# Patient Record
Sex: FEMALE | Race: BLACK OR AFRICAN AMERICAN | Employment: OTHER | URBAN - METROPOLITAN AREA
[De-identification: names, ages, dates, MRNs, and addresses within clinical notes are randomized per-mention and may not be internally consistent; named-entity substitution may affect disease eponyms.]

---

## 2020-05-21 ENCOUNTER — HOSPITAL ENCOUNTER (EMERGENCY)
Facility: HOSPITAL | Age: 85
Discharge: HOME | End: 2020-05-21
Attending: EMERGENCY MEDICINE
Payer: MEDICARE

## 2020-05-21 ENCOUNTER — APPOINTMENT (OUTPATIENT)
Dept: GENERAL RADIOLOGY | Facility: HOSPITAL | Age: 85
End: 2020-05-21
Attending: EMERGENCY MEDICINE
Payer: MEDICARE

## 2020-05-21 ENCOUNTER — APPOINTMENT (OUTPATIENT)
Dept: CT IMAGING | Facility: HOSPITAL | Age: 85
End: 2020-05-21
Attending: EMERGENCY MEDICINE
Payer: MEDICARE

## 2020-05-21 VITALS
OXYGEN SATURATION: 95 % | TEMPERATURE: 99 F | HEART RATE: 62 BPM | DIASTOLIC BLOOD PRESSURE: 68 MMHG | RESPIRATION RATE: 20 BRPM | SYSTOLIC BLOOD PRESSURE: 145 MMHG
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
## 2020-05-21 PROCEDURE — 70450 CT HEAD/BRAIN W/O DYE: CPT | Performed by: EMERGENCY MEDICINE

## 2020-05-21 PROCEDURE — 99284 EMERGENCY DEPT VISIT MOD MDM: CPT

## 2020-05-21 PROCEDURE — 72170 X-RAY EXAM OF PELVIS: CPT | Performed by: EMERGENCY MEDICINE

## 2020-05-21 NOTE — ED INITIAL ASSESSMENT (HPI)
Pt presents from [facility] for mechanical fall PTA. Pt denies pain at this time. No known LOC. No visible lumps, lacs or bruises. Dr Regalado Loop at bedside.  Pt is COVID +

## 2020-05-21 NOTE — ED NOTES
Spoke with patients daughter and Hershall Councilman who understands plan of care and will call back if she has questions

## 2020-05-21 NOTE — ED PROVIDER NOTES
Patient Seen in: [facility] AND [facility] Emergency Department    History   Patient presents with:  Fall    Stated Complaint: mechanical fall    HPI    Patient complains of mechanical fall that occurred  Today, unwitenessed at [employer].   Patient has some baseline dem commands, 2-12 intact, no focal deficit noted  SKIN: good skin turgor, no  rashes  PSYCH: calm, cooperative,      ED Course   Labs Reviewed - No data to display    MDM       Cardiac Monitor: Pulse Readings from Last 1 Encounters:  05/21/20 : 63  , sinus, contusion, intracranial hemorrhage, or further evidence of acute intracranial process by noncontrast CT technique. 2. 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   3. Senescent changes of pa

## 2020-05-21 NOTE — ED NOTES
pts daughter notified of patients results and return to nursing home. Will meet patient there. Extra discharge papers sent with patient for POA. Pt is stable at this time. [facility] contacted regarding return.  [facility] to arrive in 1 hour

## 2020-09-10 ENCOUNTER — HOSPITAL ENCOUNTER (EMERGENCY)
Facility: HOSPITAL | Age: 85
Discharge: STILL ADMITTED OR ADMINISTRATIVE | End: 2020-09-10
Attending: EMERGENCY MEDICINE
Payer: MEDICARE

## 2020-09-10 ENCOUNTER — APPOINTMENT (OUTPATIENT)
Dept: CT IMAGING | Facility: HOSPITAL | Age: 85
End: 2020-09-10
Attending: EMERGENCY MEDICINE
Payer: MEDICARE

## 2020-09-10 VITALS
BODY MASS INDEX: 23.92 KG/M2 | WEIGHT: 130 LBS | TEMPERATURE: 99 F | RESPIRATION RATE: 34 BRPM | SYSTOLIC BLOOD PRESSURE: 151 MMHG | HEART RATE: 90 BPM | DIASTOLIC BLOOD PRESSURE: 66 MMHG | HEIGHT: 62 IN | OXYGEN SATURATION: 99 %

## 2020-09-10 DIAGNOSIS — N30.90 CYSTITIS: ICD-10-CM

## 2020-09-10 DIAGNOSIS — G40.909 SEIZURE DISORDER (HCC): Primary | ICD-10-CM

## 2020-09-10 LAB
ANION GAP SERPL CALC-SCNC: 10 MMOL/L (ref 0–18)
BASOPHILS # BLD AUTO: 0.03 X10(3) UL (ref 0–0.2)
BASOPHILS NFR BLD AUTO: 0.5 %
BILIRUB UR QL: NEGATIVE
BUN BLD-MCNC: 19 MG/DL (ref 7–18)
BUN/CREAT SERPL: 13 (ref 10–20)
CALCIUM BLD-MCNC: 8.9 MG/DL (ref 8.5–10.1)
CHLORIDE SERPL-SCNC: 109 MMOL/L (ref 98–112)
CO2 SERPL-SCNC: 27 MMOL/L (ref 21–32)
COLOR UR: YELLOW
CREAT BLD-MCNC: 1.46 MG/DL (ref 0.55–1.02)
DEPRECATED RDW RBC AUTO: 40.3 FL (ref 35.1–46.3)
EOSINOPHIL # BLD AUTO: 0.34 X10(3) UL (ref 0–0.7)
EOSINOPHIL NFR BLD AUTO: 5.7 %
ERYTHROCYTE [DISTWIDTH] IN BLOOD BY AUTOMATED COUNT: 13 % (ref 11–15)
GLUCOSE BLD-MCNC: 142 MG/DL (ref 70–99)
GLUCOSE BLDC GLUCOMTR-MCNC: 122 MG/DL (ref 70–99)
GLUCOSE BLDC GLUCOMTR-MCNC: 152 MG/DL (ref 70–99)
GLUCOSE UR-MCNC: NEGATIVE MG/DL
HCT VFR BLD AUTO: 31.1 % (ref 35–48)
HGB BLD-MCNC: 9.7 G/DL (ref 12–16)
IMM GRANULOCYTES # BLD AUTO: 0.02 X10(3) UL (ref 0–1)
IMM GRANULOCYTES NFR BLD: 0.3 %
KETONES UR-MCNC: NEGATIVE MG/DL
LYMPHOCYTES # BLD AUTO: 1.58 X10(3) UL (ref 1–4)
LYMPHOCYTES NFR BLD AUTO: 26.6 %
MCH RBC QN AUTO: 27.1 PG (ref 26–34)
MCHC RBC AUTO-ENTMCNC: 31.2 G/DL (ref 31–37)
MCV RBC AUTO: 86.9 FL (ref 80–100)
MONOCYTES # BLD AUTO: 0.5 X10(3) UL (ref 0.1–1)
MONOCYTES NFR BLD AUTO: 8.4 %
NEUTROPHILS # BLD AUTO: 3.46 X10 (3) UL (ref 1.5–7.7)
NEUTROPHILS # BLD AUTO: 3.46 X10(3) UL (ref 1.5–7.7)
NEUTROPHILS NFR BLD AUTO: 58.5 %
NITRITE UR QL STRIP.AUTO: NEGATIVE
OSMOLALITY SERPL CALC.SUM OF ELEC: 307 MOSM/KG (ref 275–295)
PH UR: 6 [PH] (ref 5–8)
PLATELET # BLD AUTO: 214 10(3)UL (ref 150–450)
POTASSIUM SERPL-SCNC: 3.8 MMOL/L (ref 3.5–5.1)
PROT UR-MCNC: 30 MG/DL
RBC # BLD AUTO: 3.58 X10(6)UL (ref 3.8–5.3)
RBC #/AREA URNS AUTO: 12 /HPF
SODIUM SERPL-SCNC: 146 MMOL/L (ref 136–145)
SP GR UR STRIP: 1.01 (ref 1–1.03)
UROBILINOGEN UR STRIP-ACNC: <2
WBC # BLD AUTO: 5.9 X10(3) UL (ref 4–11)
WBC #/AREA URNS AUTO: 604 /HPF

## 2020-09-10 PROCEDURE — 82962 GLUCOSE BLOOD TEST: CPT

## 2020-09-10 PROCEDURE — 87086 URINE CULTURE/COLONY COUNT: CPT | Performed by: EMERGENCY MEDICINE

## 2020-09-10 PROCEDURE — 96365 THER/PROPH/DIAG IV INF INIT: CPT

## 2020-09-10 PROCEDURE — 70450 CT HEAD/BRAIN W/O DYE: CPT | Performed by: EMERGENCY MEDICINE

## 2020-09-10 PROCEDURE — 96375 TX/PRO/DX INJ NEW DRUG ADDON: CPT

## 2020-09-10 PROCEDURE — 99285 EMERGENCY DEPT VISIT HI MDM: CPT

## 2020-09-10 PROCEDURE — 81001 URINALYSIS AUTO W/SCOPE: CPT | Performed by: EMERGENCY MEDICINE

## 2020-09-10 PROCEDURE — 80048 BASIC METABOLIC PNL TOTAL CA: CPT | Performed by: EMERGENCY MEDICINE

## 2020-09-10 PROCEDURE — 85025 COMPLETE CBC W/AUTO DIFF WBC: CPT | Performed by: EMERGENCY MEDICINE

## 2020-09-10 PROCEDURE — 96367 TX/PROPH/DG ADDL SEQ IV INF: CPT

## 2020-09-10 RX ORDER — LORAZEPAM 2 MG/ML
1 INJECTION INTRAMUSCULAR ONCE
Status: COMPLETED | OUTPATIENT
Start: 2020-09-10 | End: 2020-09-10

## 2020-09-10 RX ORDER — LORAZEPAM 2 MG/ML
INJECTION INTRAMUSCULAR
Status: COMPLETED
Start: 2020-09-10 | End: 2020-09-10

## 2020-09-10 NOTE — CM/SW NOTE
Spoke to [facility] requesting to transfer the pt to their facility. Face sheet faxed to [phone]    The call transferred to Dr Juana Cabral for report.

## 2020-09-10 NOTE — ED NOTES
Krystina Amor from [address] [place] [address] called and gave room # 2367 Bed 1 and to call for report at [phone]. The room at [facility] is not available currently and the transfer center will call when room is available will continue to monitor patient.

## 2020-09-10 NOTE — ED INITIAL ASSESSMENT (HPI)
Pt brought via ems from [facility] for a grand mal seizure, according to staff pt had a seizure lasting 5 minutes, staff did not give anything for seizure, pt hx of epilepsy. Pt baseline is a/o x1.

## 2020-09-10 NOTE — ED NOTES
Patient started to have a seizure that lasted approximately one minute. MD at [facility]. Ativan given. Patient breathing without difficulty. Will continue to monitor.

## 2020-09-10 NOTE — ED PROVIDER NOTES
Patient Seen in: [facility] AND [facility] Emergency Department      History   Patient presents with:  Seizure Disorder    Stated Complaint: seizure    HPI    History is provided by EMS.     82-year-old female with history of dementia, epilepsy, on lacosamide, he Neck:      Musculoskeletal: Neck supple. Cardiovascular:      Rate and Rhythm: Regular rhythm. Pulmonary:      Effort: Pulmonary effort is normal.   Abdominal:      Palpations: Abdomen is soft. Musculoskeletal:         General: No deformity.       C MCHC 31.2 31.0 - 37.0 g/dL    RDW-SD 40.3 35.1 - 46.3 fL    RDW 13.0 11.0 - 15.0 %    .0 150.0 - 450.0 10(3)uL    Neutrophil Absolute Prelim 3.46 1.50 - 7.70 x10 (3) uL    Neutrophil Absolute 3.46 1.50 - 7.70 x10(3) uL    Lymphocyte Absolute 1.58 1. 9/10/2020 at 3:29 PM     Finalized by (CST): Madisyn Miller MD on 9/10/2020 at 3:35 PM              EMERGENCY DEPARTMENT COURSE AND TREATMENT:  Patient's condition was fair during Emergency Department evaluation.      85yoF with seizure  - I personally r [id number]  [phone]    Schedule an appointment as soon as possible for a visit in 2 days  As needed    We recommend that you schedule follow up care with a primary care provider within the next three months to obtain basic health screening including reasse

## 2020-09-10 NOTE — ED NOTES
[place] critical transport team at [facility] to transport patient to [facility] [place] by [facility] room 2367. EMTALA form filled out, ct cd given to Critical Care transport RN and ed summary. Patient stable upon transport.

## 2020-09-10 NOTE — ED NOTES
Patient's daughter called requesting patient be immediately transferred to [facility]. Patient's daughter explained that patients needs to have all tests completed to ensure safe transfer to [facility].  Patient's daughter verbalizes understanding but states \"I do

## 2020-09-10 NOTE — ED NOTES
Tech informed nurse of patient increased heart rate. Nurse identified patient in a seizure episode. Doctor and nurse at bedside.

## 2020-09-10 NOTE — ED NOTES
Spoke with patient's daughter Arpan Prabhakar via phone. Pt's daughter very upset that patient was not transported to [facility]. Pt's daughter requesting pt be transferred to [facility].  This RN explained to daughter that once we get patient's labs, we will work transfer